# Patient Record
Sex: MALE | Race: ASIAN | URBAN - METROPOLITAN AREA
[De-identification: names, ages, dates, MRNs, and addresses within clinical notes are randomized per-mention and may not be internally consistent; named-entity substitution may affect disease eponyms.]

---

## 2023-06-23 ENCOUNTER — OFFICE VISIT (OUTPATIENT)
Dept: URBAN - METROPOLITAN AREA CLINIC 23 | Facility: CLINIC | Age: 43
End: 2023-06-23
Payer: COMMERCIAL

## 2023-06-23 ENCOUNTER — WEB ENCOUNTER (OUTPATIENT)
Dept: URBAN - METROPOLITAN AREA CLINIC 23 | Facility: CLINIC | Age: 43
End: 2023-06-23

## 2023-06-23 ENCOUNTER — TELEPHONE ENCOUNTER (OUTPATIENT)
Dept: URBAN - METROPOLITAN AREA CLINIC 92 | Facility: CLINIC | Age: 43
End: 2023-06-23

## 2023-06-23 ENCOUNTER — LAB OUTSIDE AN ENCOUNTER (OUTPATIENT)
Dept: URBAN - METROPOLITAN AREA CLINIC 23 | Facility: CLINIC | Age: 43
End: 2023-06-23

## 2023-06-23 VITALS
HEIGHT: 76 IN | BODY MASS INDEX: 28.71 KG/M2 | DIASTOLIC BLOOD PRESSURE: 85 MMHG | TEMPERATURE: 97.4 F | HEART RATE: 50 BPM | SYSTOLIC BLOOD PRESSURE: 134 MMHG | WEIGHT: 235.8 LBS

## 2023-06-23 DIAGNOSIS — K51.20 ULCERATIVE PROCTITIS WITHOUT COMPLICATION: ICD-10-CM

## 2023-06-23 DIAGNOSIS — K51.80 OTHER ULCERATIVE COLITIS: ICD-10-CM

## 2023-06-23 PROBLEM — 3951002: Status: ACTIVE | Noted: 2023-06-23

## 2023-06-23 PROCEDURE — 99204 OFFICE O/P NEW MOD 45 MIN: CPT | Performed by: INTERNAL MEDICINE

## 2023-06-23 RX ORDER — PEG-3350, SODIUM SULFATE, SODIUM CHLORIDE, POTASSIUM CHLORIDE, SODIUM ASCORBATE AND ASCORBIC ACID 7.5-2.691G
AS DIRECTED KIT ORAL AS DIRECTED
Qty: 1 KIT | Refills: 0 | OUTPATIENT
Start: 2023-06-23 | End: 2023-06-24

## 2023-06-23 NOTE — PREVIOUS WORKUP REVIEWED
. ENDOSCOPIES  LABS -Labs 6/20/2023: WBC 10, hemoglobin 12.7, platelet 283, BUN 12, creatinine 0.91, total protein 7.0, albumin 4.6, total bilirubin 0.4, alkaline phosphatase 80, AST 21, ALT 16.  TSH 2.7.  IMAGES

## 2023-06-23 NOTE — HPI-TODAY'S VISIT:
42-year-old male with a history of ulcerative colitis presents for flareup symptom.   He was first diagnosed with a UC back in 2012. Presentation was bloody stool.  To his knowledge it was contained in the rectum.  Oral mesalamine and suppository treatment.  Well-controlled  Until 2019, normal colonoscopy then and decision was made to be off the medication. He has been doing well until now, 1-2 weeks ago he started having bloody stool, on toilet paper, mucus, 3-5 bowel movements per day, postprandial.  Solid-loose consistency.

## 2023-06-26 ENCOUNTER — OFFICE VISIT (OUTPATIENT)
Dept: URBAN - METROPOLITAN AREA CLINIC 33 | Facility: CLINIC | Age: 43
End: 2023-06-26

## 2023-07-03 LAB
A/G RATIO: 1.6
ALBUMIN: 4.2
ALKALINE PHOSPHATASE: 71
ALT (SGPT): 13
AST (SGOT): 15
BILIRUBIN, TOTAL: 0.6
BUN/CREATININE RATIO: (no result)
BUN: 10
C-REACTIVE PROTEIN, QUANT: 2.5
CALCIUM: 8.8
CARBON DIOXIDE, TOTAL: 25
CHLORIDE: 107
CREATININE: 0.86
EGFR: 110
GLOBULIN, TOTAL: 2.7
GLUCOSE: 92
HEMATOCRIT: 38.5
HEMOGLOBIN: 12.8
MCH: 28.6
MCHC: 33.2
MCV: 86.1
MPV: 10.9
PLATELET COUNT: 321
POTASSIUM: 4.4
PROTEIN, TOTAL: 6.9
RDW: 13.1
RED BLOOD CELL COUNT: 4.47
SED RATE BY MODIFIED: 2
SODIUM: 140
WHITE BLOOD CELL COUNT: 6.7

## 2023-07-11 LAB
CALPROTECTIN, FECAL: 322
CAMPYLOBACTER GROUP: NOT DETECTED
CLOSTRIDIUM DIFFICILE: (no result)
NOROVIRUS GI/GII: NOT DETECTED
ROTAVIRUS A: NOT DETECTED
SALMONELLA SPECIES: NOT DETECTED
SHIGA TOXIN 1: NOT DETECTED
SHIGA TOXIN 2: NOT DETECTED
SHIGELLA SPECIES: NOT DETECTED
VIBRIO GROUP: NOT DETECTED
YERSINIA ENTEROCOLITICA: NOT DETECTED

## 2023-07-13 ENCOUNTER — CLAIMS CREATED FROM THE CLAIM WINDOW (OUTPATIENT)
Dept: URBAN - METROPOLITAN AREA CLINIC 4 | Facility: CLINIC | Age: 43
End: 2023-07-13
Payer: COMMERCIAL

## 2023-07-13 ENCOUNTER — LAB OUTSIDE AN ENCOUNTER (OUTPATIENT)
Dept: URBAN - METROPOLITAN AREA CLINIC 78 | Facility: CLINIC | Age: 43
End: 2023-07-13

## 2023-07-13 ENCOUNTER — OFFICE VISIT (OUTPATIENT)
Dept: URBAN - METROPOLITAN AREA SURGERY CENTER 15 | Facility: SURGERY CENTER | Age: 43
End: 2023-07-13
Payer: COMMERCIAL

## 2023-07-13 ENCOUNTER — TELEPHONE ENCOUNTER (OUTPATIENT)
Dept: URBAN - METROPOLITAN AREA CLINIC 78 | Facility: CLINIC | Age: 43
End: 2023-07-13

## 2023-07-13 DIAGNOSIS — K51.80 CHRONIC PANCOLONIC ULCERATIVE COLITIS: ICD-10-CM

## 2023-07-13 DIAGNOSIS — K51.919 ULCERATIVE COLITIS, UNSPECIFIED WITH UNSPECIFIED COMPLICATIONS: ICD-10-CM

## 2023-07-13 PROBLEM — 235746007: Status: ACTIVE | Noted: 2023-07-13

## 2023-07-13 PROCEDURE — G8907 PT DOC NO EVENTS ON DISCHARG: HCPCS | Performed by: INTERNAL MEDICINE

## 2023-07-13 PROCEDURE — 45380 COLONOSCOPY AND BIOPSY: CPT | Performed by: INTERNAL MEDICINE

## 2023-07-13 PROCEDURE — 88342 IMHCHEM/IMCYTCHM 1ST ANTB: CPT | Performed by: PATHOLOGY

## 2023-07-13 PROCEDURE — 88305 TISSUE EXAM BY PATHOLOGIST: CPT | Performed by: PATHOLOGY

## 2023-07-13 RX ORDER — PREDNISONE 10 MG/1
4 TABLETS TABLET ORAL ONCE A DAY
Qty: 120 TABLET | Refills: 1 | OUTPATIENT
Start: 2023-07-13 | End: 2023-09-11

## 2023-07-20 ENCOUNTER — TELEPHONE ENCOUNTER (OUTPATIENT)
Dept: URBAN - METROPOLITAN AREA CLINIC 92 | Facility: CLINIC | Age: 43
End: 2023-07-20

## 2023-07-25 ENCOUNTER — TELEPHONE ENCOUNTER (OUTPATIENT)
Dept: URBAN - METROPOLITAN AREA CLINIC 92 | Facility: CLINIC | Age: 43
End: 2023-07-25

## 2023-07-28 LAB
HBSAG SCREEN: NEGATIVE
HEP B CORE AB, TOT: POSITIVE
HEPATITIS B SURF AB QUANT: >1000
QUANTIFERON CRITERIA: (no result)
QUANTIFERON INCUBATION: (no result)
QUANTIFERON MITOGEN VALUE: >10
QUANTIFERON NIL VALUE: 0
QUANTIFERON TB1 AG VALUE: 0.25
QUANTIFERON TB2 AG VALUE: 0.37
QUANTIFERON-TB GOLD PLUS: POSITIVE

## 2023-07-29 ENCOUNTER — TELEPHONE ENCOUNTER (OUTPATIENT)
Dept: URBAN - METROPOLITAN AREA CLINIC 78 | Facility: CLINIC | Age: 43
End: 2023-07-29

## 2023-08-07 ENCOUNTER — LAB OUTSIDE AN ENCOUNTER (OUTPATIENT)
Dept: URBAN - METROPOLITAN AREA CLINIC 78 | Facility: CLINIC | Age: 43
End: 2023-08-07

## 2023-08-14 ENCOUNTER — CLAIMS CREATED FROM THE CLAIM WINDOW (OUTPATIENT)
Dept: URBAN - METROPOLITAN AREA CLINIC 23 | Facility: CLINIC | Age: 43
End: 2023-08-14

## 2023-08-14 ENCOUNTER — OFFICE VISIT (OUTPATIENT)
Dept: URBAN - METROPOLITAN AREA CLINIC 23 | Facility: CLINIC | Age: 43
End: 2023-08-14

## 2023-08-14 ENCOUNTER — TELEPHONE ENCOUNTER (OUTPATIENT)
Dept: URBAN - METROPOLITAN AREA CLINIC 92 | Facility: CLINIC | Age: 43
End: 2023-08-14

## 2023-08-14 ENCOUNTER — CLAIMS CREATED FROM THE CLAIM WINDOW (OUTPATIENT)
Dept: URBAN - METROPOLITAN AREA CLINIC 23 | Facility: CLINIC | Age: 43
End: 2023-08-14
Payer: COMMERCIAL

## 2023-08-14 VITALS
DIASTOLIC BLOOD PRESSURE: 79 MMHG | BODY MASS INDEX: 27.67 KG/M2 | SYSTOLIC BLOOD PRESSURE: 120 MMHG | TEMPERATURE: 97.2 F | WEIGHT: 227.2 LBS | HEIGHT: 76 IN | HEART RATE: 58 BPM

## 2023-08-14 DIAGNOSIS — K52.9 INFLAMMATORY BOWEL DISEASES (IBD): ICD-10-CM

## 2023-08-14 PROCEDURE — 99214 OFFICE O/P EST MOD 30 MIN: CPT | Performed by: INTERNAL MEDICINE

## 2023-08-14 RX ORDER — PREDNISONE 10 MG/1
4 TABLETS TABLET ORAL ONCE A DAY
Qty: 120 TABLET | Refills: 1 | Status: ACTIVE | COMMUNITY
Start: 2023-07-13 | End: 2023-09-11

## 2023-08-14 RX ORDER — MESALAMINE 1.2 G/1
4 TABLETS TABLET, DELAYED RELEASE ORAL ONCE A DAY
Qty: 360 TABLET | Refills: 3 | OUTPATIENT
Start: 2023-08-14 | End: 2024-08-08

## 2023-08-14 NOTE — PREVIOUS WORKUP REVIEWED
REVIEWED EXTERNAL MEDICAL RECORDENDOSCOPIES-Colonoscopy 7/13/2023:Normal TI, inflammation confluent from the rectum to the sigmoid colon. Patchy inflammation from the sigmoid-cecum. Moderate in severity.*Pathology:Ascending colon-diffuse chronic active colitis. Transverse colon-diffuse chronic active colitis. Descending colon-normal. Sigmoid colon-patchy chronic active colitis. Rectum-diffuse chronic active colitis.LABS-Labs 8/9/2023: HBV DNA not detected.-Labs 7/25/2023: Hep B cAb total positive, Hep B sAg negative. Hep B sAb positive. QuantiFERON gold positive.-Labs 7/5/2023: C. difficile negative. GI PCR negative.-Labs 6/30/2023: ESR 2, CRP 2.5, fecal calprotectin 322 H. BUN 10, creatinine 0.86, total bilirubin 0.6, alkaline phosphatase 71, AST 15, ALT 13. WBC 6.7, hemoglobin 12.8, platelet 10.9.-Labs 10/16/2023: WBC 6.7, hemoglobin 12.6, platelet 311, total bilirubin 0.4, alkaline phosphatase 81, AST 16, ALT 11.-Labs 6/20/2023: WBC 10, hemoglobin 12.7, platelet 283, BUN 12, creatinine 0.91, total protein 7.0, albumin 4.6, total bilirubin 0.4, alkaline phosphatase 80, AST 21, ALT 16. TSH 2.7.IMAGES-CT enterography 8/7/2023:Normal exam including GI tract.IBD SYNOPSISColitis, initially diagnosed as ulcerative colitis.  Colonoscopy 7/2023 suggesting possible Crohn's colitis given distribution of patchy inflammation.  Involving >30%.Good response to mesalamine.

## 2023-08-14 NOTE — HPI-TODAY'S VISIT:
43-year-old male presents for colitis.  He had colonoscopy for flareup that occurred after off the medication for long time. he has a history of ulcerative colitis, but the colonoscopy finding suggest possibility of Crohn's colitis with skip lesions. CT enterography unremarkable. He has been on prednisone 40 mg since the colonoscopy, currently his bowels back to normal.  No blood in stool.  He moves bowel once daily. QuantiFERON gold came back positive.  And hep B serology positive suggesting past infection.

## 2023-08-16 LAB
HBV LOG10: (no result)
HEPATITIS B QUANTITATION: (no result)
TEST INFORMATION:: (no result)

## 2023-10-05 ENCOUNTER — TELEPHONE ENCOUNTER (OUTPATIENT)
Dept: URBAN - METROPOLITAN AREA CLINIC 23 | Facility: CLINIC | Age: 43
End: 2023-10-05

## 2023-10-06 ENCOUNTER — TELEPHONE ENCOUNTER (OUTPATIENT)
Dept: URBAN - METROPOLITAN AREA CLINIC 92 | Facility: CLINIC | Age: 43
End: 2023-10-06

## 2023-10-27 ENCOUNTER — OFFICE VISIT (OUTPATIENT)
Dept: URBAN - METROPOLITAN AREA CLINIC 23 | Facility: CLINIC | Age: 43
End: 2023-10-27
Payer: COMMERCIAL

## 2023-10-27 VITALS
TEMPERATURE: 97.6 F | HEIGHT: 76 IN | HEART RATE: 80 BPM | SYSTOLIC BLOOD PRESSURE: 133 MMHG | WEIGHT: 232.6 LBS | BODY MASS INDEX: 28.33 KG/M2 | DIASTOLIC BLOOD PRESSURE: 86 MMHG

## 2023-10-27 DIAGNOSIS — A15.9 TUBERCULOSIS: ICD-10-CM

## 2023-10-27 DIAGNOSIS — K50.10 CROHN'S DISEASE OF COLON WITHOUT COMPLICATION: ICD-10-CM

## 2023-10-27 PROCEDURE — 99213 OFFICE O/P EST LOW 20 MIN: CPT | Performed by: INTERNAL MEDICINE

## 2023-10-27 RX ORDER — MESALAMINE 1.2 G/1
4 TABLETS TABLET, DELAYED RELEASE ORAL ONCE A DAY
Qty: 360 TABLET | Refills: 3 | Status: ACTIVE | COMMUNITY
Start: 2023-08-14 | End: 2024-08-08

## 2023-10-27 NOTE — PREVIOUS WORKUP REVIEWED
REVIEWED EXTERNAL MEDICAL RECORD ENDOSCOPIES -Colonoscopy 7/13/2023:Normal TI, inflammation confluent from the rectum to the sigmoid colon. Patchy inflammation from the sigmoid-cecum. Moderate in severity. *Pathology:Ascending colon-diffuse chronic active colitis. Transverse colon-diffuse chronic active colitis. Descending colon-normal. Sigmoid colon-patchy chronic active colitis. Rectum-diffuse chronic active colitis.  LABS -Labs 10/16/2023: WBC 6.7, hemoglobin 12.6, platelet 311, BUN 11, creatinine 0.87, total protein 6.7, albumin 4.4, total bilirubin 0.4, alkaline phosphatase 81, AST 16, ALT 11. -Labs 8/9/2023: HBV DNA not detected.-Labs 7/25/2023: Hep B cAb total positive, Hep B sAg negative. Hep B sAb positive. QuantiFERON gold positive.-Labs 7/5/2023: C. difficile negative. GI PCR negative.-Labs 6/30/2023: ESR 2, CRP 2.5, fecal calprotectin 322 H. BUN 10, creatinine 0.86, total bilirubin 0.6, alkaline phosphatase 71, AST 15, ALT 13. WBC 6.7, hemoglobin 12.8, platelet 10.9.-Labs 10/16/2023: WBC 6.7, hemoglobin 12.6, platelet 311, total bilirubin 0.4, alkaline phosphatase 81, AST 16, ALT 11. -Labs 6/20/2023: WBC 10, hemoglobin 12.7, platelet 283, BUN 12, creatinine 0.91, total protein 7.0, albumin 4.6, total bilirubin 0.4, alkaline phosphatase 80, AST 21, ALT 16. TSH 2.7.  IMAGES -CT enterography 8/7/2023:Normal exam including GI tract.  IBD SYNOPSIS Colitis, initially diagnosed as ulcerative colitis.  Colonoscopy 7/2023 suggesting possible Crohn's colitis given distribution of patchy inflammation.  Involving >30%. Good response to mesalamine.

## 2023-10-27 NOTE — HPI-TODAY'S VISIT:
43-year-old male presents for follow-up of Crohn's colitis.  He is doing well.  He moves bowel once daily.  Completely off prednisone. He is on metformin 4.8 g daily.   He is seeing ID for tuberculosis.  He is taking rifampin, with plan of 4 months treatment.  Dr. Solis.

## 2023-11-11 PROBLEM — 56717001: Status: ACTIVE | Noted: 2023-11-11

## 2024-04-26 ENCOUNTER — OV EP (OUTPATIENT)
Dept: URBAN - METROPOLITAN AREA CLINIC 23 | Facility: CLINIC | Age: 44
End: 2024-04-26
Payer: COMMERCIAL

## 2024-04-26 ENCOUNTER — LAB (OUTPATIENT)
Dept: URBAN - METROPOLITAN AREA CLINIC 23 | Facility: CLINIC | Age: 44
End: 2024-04-26

## 2024-04-26 VITALS
WEIGHT: 243 LBS | HEIGHT: 76 IN | DIASTOLIC BLOOD PRESSURE: 81 MMHG | SYSTOLIC BLOOD PRESSURE: 127 MMHG | TEMPERATURE: 97.7 F | HEART RATE: 50 BPM | BODY MASS INDEX: 29.59 KG/M2

## 2024-04-26 DIAGNOSIS — Z20.5 EXPOSURE TO HEPATITIS B: ICD-10-CM

## 2024-04-26 DIAGNOSIS — K50.10 CROHN'S DISEASE OF COLON WITHOUT COMPLICATION: ICD-10-CM

## 2024-04-26 DIAGNOSIS — Z22.7 LATENT TUBERCULOSIS: ICD-10-CM

## 2024-04-26 PROBLEM — 50440006: Status: ACTIVE | Noted: 2024-04-26

## 2024-04-26 PROCEDURE — 99214 OFFICE O/P EST MOD 30 MIN: CPT | Performed by: INTERNAL MEDICINE

## 2024-04-26 RX ORDER — MESALAMINE 1.2 G/1
4 TABLETS TABLET, DELAYED RELEASE ORAL ONCE A DAY
Qty: 360 TABLET | Refills: 3 | Status: ACTIVE | COMMUNITY
Start: 2023-08-14 | End: 2024-08-08

## 2024-04-26 RX ORDER — SODIUM, POTASSIUM,MAG SULFATES 17.5-3.13G
177 ML THE FIRST DOSE THE EVENING BEFORE AND SECOND DOSE 5-8 HRS BEFORE COLONOSCOPY. THIS IS A GENERIC FOR SUPREP SOLUTION, RECONSTITUTED, ORAL ORAL AS DIRECTED
Qty: 354 MILLILITER | Refills: 0 | OUTPATIENT
Start: 2024-04-26 | End: 2024-04-27

## 2024-04-26 RX ORDER — MESALAMINE 1.2 G/1
4 TABLETS TABLET, DELAYED RELEASE ORAL ONCE A DAY
Qty: 360 TABLET | Refills: 3
Start: 2023-08-14 | End: 2025-04-21

## 2024-04-26 NOTE — HPI-TODAY'S VISIT:
43-year-old male with Crohn's colitis presents for follow-up.  He takes mesalamine 4.8 g daily.  Is doing well, moves bowel couple times a day solid.  He had a 1 episode of minor flareup in the interim.  Currently back to normal baseline.  He finished tuberculosis treatment.

## 2024-04-26 NOTE — PREVIOUS WORKUP REVIEWED EXTERNAL MEDICAL RECORD
REVIEWED EXTERNAL MEDICAL RECORD ENDOSCOPIES -Colonoscopy 7/13/2023:Normal TI, inflammation confluent from the rectum to the sigmoid colon. Patchy inflammation from the sigmoid-cecum. Moderate in severity. *Pathology:Ascending colon-diffuse chronic active colitis. Transverse colon-diffuse chronic active colitis. Descending colon-normal. Sigmoid colon-patchy chronic active colitis. Rectum-diffuse chronic active colitis.  LABS -Labs 10/16/2023: WBC 6.7, hemoglobin 12.6, platelet 311, BUN 11, creatinine 0.87, total protein 6.7, albumin 4.4, total bilirubin 0.4, alkaline phosphatase 81, AST 16, ALT 11. -Labs 8/9/2023: HBV DNA not detected.-Labs 7/25/2023: Hep B cAb total positive, Hep B sAg negative. Hep B sAb positive. QuantiFERON gold positive.-Labs 7/5/2023: C. difficile negative. GI PCR negative.-Labs 6/30/2023: ESR 2, CRP 2.5, fecal calprotectin 322 H. BUN 10, creatinine 0.86, total bilirubin 0.6, alkaline phosphatase 71, AST 15, ALT 13. WBC 6.7, hemoglobin 12.8, platelet 10.9.-Labs 10/16/2023: WBC 6.7, hemoglobin 12.6, platelet 311, total bilirubin 0.4, alkaline phosphatase 81, AST 16, ALT 11. -Labs 6/20/2023: WBC 10, hemoglobin 12.7, platelet 283, BUN 12, creatinine 0.91, total protein 7.0, albumin 4.6, total bilirubin 0.4, alkaline phosphatase 80, AST 21, ALT 16. TSH 2.7.  IMAGES -CT enterography 8/7/2023:Normal exam including GI tract.  IBD SYNOPSIS Colitis, initially diagnosed as ulcerative colitis.  Colonoscopy 7/2023 suggesting possible Crohn's colitis given distribution of patchy inflammation.  Involving >30%. Good response to mesalamine. , -

## 2024-05-16 ENCOUNTER — OFFICE VISIT (OUTPATIENT)
Dept: URBAN - METROPOLITAN AREA SURGERY CENTER 15 | Facility: SURGERY CENTER | Age: 44
End: 2024-05-16

## 2024-05-30 ENCOUNTER — OFFICE VISIT (OUTPATIENT)
Dept: URBAN - METROPOLITAN AREA SURGERY CENTER 15 | Facility: SURGERY CENTER | Age: 44
End: 2024-05-30

## 2024-05-30 RX ORDER — MESALAMINE 1.2 G/1
4 TABLETS TABLET, DELAYED RELEASE ORAL ONCE A DAY
Qty: 360 TABLET | Refills: 3 | Status: ACTIVE | COMMUNITY
Start: 2023-08-14 | End: 2025-04-21

## 2024-05-30 RX ORDER — MESALAMINE 1000 MG/1
1 SUPPOSITORY AT BEDTIME SUPPOSITORY RECTAL ONCE A DAY
Qty: 30 | Refills: 1 | OUTPATIENT
Start: 2024-05-30 | End: 2024-07-29

## 2024-06-03 ENCOUNTER — TELEPHONE ENCOUNTER (OUTPATIENT)
Dept: URBAN - METROPOLITAN AREA CLINIC 78 | Facility: CLINIC | Age: 44
End: 2024-06-03

## 2024-06-03 RX ORDER — VEDOLIZUMAB 300 MG/5ML
AS DIRECTED INJECTION, POWDER, LYOPHILIZED, FOR SOLUTION INTRAVENOUS
OUTPATIENT
Start: 2024-06-03

## 2024-06-12 ENCOUNTER — TELEPHONE ENCOUNTER (OUTPATIENT)
Dept: URBAN - METROPOLITAN AREA CLINIC 23 | Facility: CLINIC | Age: 44
End: 2024-06-12

## 2024-07-22 ENCOUNTER — TELEPHONE ENCOUNTER (OUTPATIENT)
Dept: URBAN - METROPOLITAN AREA CLINIC 23 | Facility: CLINIC | Age: 44
End: 2024-07-22

## 2024-07-22 ENCOUNTER — OFFICE VISIT (OUTPATIENT)
Dept: URBAN - METROPOLITAN AREA CLINIC 77 | Facility: CLINIC | Age: 44
End: 2024-07-22
Payer: COMMERCIAL

## 2024-07-22 VITALS
DIASTOLIC BLOOD PRESSURE: 71 MMHG | TEMPERATURE: 98.5 F | SYSTOLIC BLOOD PRESSURE: 128 MMHG | BODY MASS INDEX: 28.49 KG/M2 | WEIGHT: 234 LBS | HEIGHT: 76 IN

## 2024-07-22 DIAGNOSIS — K50.80 CROHN'S COLITIS: ICD-10-CM

## 2024-07-22 PROCEDURE — 96413 CHEMO IV INFUSION 1 HR: CPT | Performed by: INTERNAL MEDICINE

## 2024-07-22 RX ORDER — VEDOLIZUMAB 300 MG/5ML
AS DIRECTED INJECTION, POWDER, LYOPHILIZED, FOR SOLUTION INTRAVENOUS
Status: ACTIVE | COMMUNITY
Start: 2024-06-03

## 2024-07-22 RX ORDER — MESALAMINE 1.2 G/1
4 TABLETS TABLET, DELAYED RELEASE ORAL ONCE A DAY
Qty: 360 TABLET | Refills: 3 | Status: ACTIVE | COMMUNITY
Start: 2023-08-14 | End: 2025-04-21

## 2024-07-22 RX ORDER — MESALAMINE 1000 MG/1
1 SUPPOSITORY AT BEDTIME SUPPOSITORY RECTAL ONCE A DAY
Qty: 30 | Refills: 1 | Status: ACTIVE | COMMUNITY
Start: 2024-05-30 | End: 2024-07-29

## 2024-07-30 ENCOUNTER — TELEPHONE ENCOUNTER (OUTPATIENT)
Dept: URBAN - METROPOLITAN AREA CLINIC 23 | Facility: CLINIC | Age: 44
End: 2024-07-30

## 2024-07-30 RX ORDER — MESALAMINE 1000 MG/1
1 SUPPOSITORY AT BEDTIME SUPPOSITORY RECTAL ONCE A DAY
Qty: 30 | Refills: 1
Start: 2024-05-30 | End: 2024-09-28

## 2024-08-05 ENCOUNTER — OFFICE VISIT (OUTPATIENT)
Dept: URBAN - METROPOLITAN AREA CLINIC 77 | Facility: CLINIC | Age: 44
End: 2024-08-05
Payer: COMMERCIAL

## 2024-08-05 VITALS
WEIGHT: 230 LBS | DIASTOLIC BLOOD PRESSURE: 79 MMHG | BODY MASS INDEX: 28.01 KG/M2 | HEIGHT: 76 IN | SYSTOLIC BLOOD PRESSURE: 121 MMHG | TEMPERATURE: 98.7 F

## 2024-08-05 DIAGNOSIS — K50.80 CROHN'S COLITIS: ICD-10-CM

## 2024-08-05 PROCEDURE — 96413 CHEMO IV INFUSION 1 HR: CPT | Performed by: INTERNAL MEDICINE

## 2024-08-05 RX ORDER — MESALAMINE 1000 MG/1
1 SUPPOSITORY AT BEDTIME SUPPOSITORY RECTAL ONCE A DAY
Qty: 30 | Refills: 1 | Status: ACTIVE | COMMUNITY
Start: 2024-05-30 | End: 2024-09-28

## 2024-08-05 RX ORDER — MESALAMINE 1.2 G/1
4 TABLETS TABLET, DELAYED RELEASE ORAL ONCE A DAY
Qty: 360 TABLET | Refills: 3 | Status: ACTIVE | COMMUNITY
Start: 2023-08-14 | End: 2025-04-21

## 2024-08-05 RX ORDER — VEDOLIZUMAB 300 MG/5ML
AS DIRECTED INJECTION, POWDER, LYOPHILIZED, FOR SOLUTION INTRAVENOUS
Status: ACTIVE | COMMUNITY
Start: 2024-06-03

## 2024-09-06 ENCOUNTER — OFFICE VISIT (OUTPATIENT)
Dept: URBAN - METROPOLITAN AREA CLINIC 97 | Facility: CLINIC | Age: 44
End: 2024-09-06
Payer: COMMERCIAL

## 2024-09-06 VITALS
HEIGHT: 76 IN | RESPIRATION RATE: 18 BRPM | SYSTOLIC BLOOD PRESSURE: 135 MMHG | TEMPERATURE: 98.4 F | HEART RATE: 84 BPM | WEIGHT: 228 LBS | DIASTOLIC BLOOD PRESSURE: 76 MMHG | BODY MASS INDEX: 27.76 KG/M2

## 2024-09-06 DIAGNOSIS — K50.80 CROHN'S COLITIS: ICD-10-CM

## 2024-09-06 PROCEDURE — 96413 CHEMO IV INFUSION 1 HR: CPT | Performed by: INTERNAL MEDICINE

## 2024-09-06 RX ORDER — VEDOLIZUMAB 300 MG/5ML
AS DIRECTED INJECTION, POWDER, LYOPHILIZED, FOR SOLUTION INTRAVENOUS
Status: ACTIVE | COMMUNITY
Start: 2024-06-03

## 2024-09-06 RX ORDER — MESALAMINE 1.2 G/1
4 TABLETS TABLET, DELAYED RELEASE ORAL ONCE A DAY
Qty: 360 TABLET | Refills: 3 | Status: ACTIVE | COMMUNITY
Start: 2023-08-14 | End: 2025-04-21

## 2024-09-06 RX ORDER — MESALAMINE 1000 MG/1
1 SUPPOSITORY AT BEDTIME SUPPOSITORY RECTAL ONCE A DAY
Qty: 30 | Refills: 1 | Status: ACTIVE | COMMUNITY
Start: 2024-05-30 | End: 2024-09-28

## 2024-10-28 ENCOUNTER — OFFICE VISIT (OUTPATIENT)
Dept: URBAN - METROPOLITAN AREA CLINIC 77 | Facility: CLINIC | Age: 44
End: 2024-10-28
Payer: COMMERCIAL

## 2024-10-28 VITALS
BODY MASS INDEX: 27.4 KG/M2 | DIASTOLIC BLOOD PRESSURE: 67 MMHG | WEIGHT: 225 LBS | SYSTOLIC BLOOD PRESSURE: 119 MMHG | TEMPERATURE: 98.4 F | HEIGHT: 76 IN

## 2024-10-28 DIAGNOSIS — K50.80 CROHN'S COLITIS: ICD-10-CM

## 2024-10-28 PROCEDURE — 96413 CHEMO IV INFUSION 1 HR: CPT | Performed by: INTERNAL MEDICINE

## 2024-10-28 RX ORDER — VEDOLIZUMAB 300 MG/5ML
AS DIRECTED INJECTION, POWDER, LYOPHILIZED, FOR SOLUTION INTRAVENOUS
Status: ACTIVE | COMMUNITY
Start: 2024-06-03

## 2024-10-28 RX ORDER — MESALAMINE 1.2 G/1
4 TABLETS TABLET, DELAYED RELEASE ORAL ONCE A DAY
Qty: 360 TABLET | Refills: 3 | Status: ACTIVE | COMMUNITY
Start: 2023-08-14 | End: 2025-04-21

## 2024-12-30 ENCOUNTER — OFFICE VISIT (OUTPATIENT)
Dept: URBAN - METROPOLITAN AREA CLINIC 77 | Facility: CLINIC | Age: 44
End: 2024-12-30
Payer: COMMERCIAL

## 2024-12-30 VITALS
BODY MASS INDEX: 27.4 KG/M2 | HEIGHT: 76 IN | DIASTOLIC BLOOD PRESSURE: 87 MMHG | SYSTOLIC BLOOD PRESSURE: 148 MMHG | WEIGHT: 225 LBS | TEMPERATURE: 98.3 F

## 2024-12-30 DIAGNOSIS — K50.10 CROHN'S DISEASE OF COLON WITHOUT COMPLICATION: ICD-10-CM

## 2024-12-30 PROCEDURE — 96413 CHEMO IV INFUSION 1 HR: CPT | Performed by: INTERNAL MEDICINE

## 2024-12-30 RX ORDER — MESALAMINE 1.2 G/1
4 TABLETS TABLET, DELAYED RELEASE ORAL ONCE A DAY
Qty: 360 TABLET | Refills: 3 | Status: ACTIVE | COMMUNITY
Start: 2023-08-14 | End: 2025-04-21

## 2024-12-30 RX ORDER — VEDOLIZUMAB 300 MG/5ML
AS DIRECTED INJECTION, POWDER, LYOPHILIZED, FOR SOLUTION INTRAVENOUS
Status: ACTIVE | COMMUNITY
Start: 2024-06-03

## 2025-02-24 ENCOUNTER — OFFICE VISIT (OUTPATIENT)
Dept: URBAN - METROPOLITAN AREA CLINIC 77 | Facility: CLINIC | Age: 45
End: 2025-02-24
Payer: COMMERCIAL

## 2025-02-24 VITALS
BODY MASS INDEX: 28.18 KG/M2 | DIASTOLIC BLOOD PRESSURE: 77 MMHG | SYSTOLIC BLOOD PRESSURE: 126 MMHG | RESPIRATION RATE: 18 BRPM | WEIGHT: 231.4 LBS | TEMPERATURE: 98.4 F | HEIGHT: 76 IN

## 2025-02-24 DIAGNOSIS — K50.10 CROHN'S DISEASE OF COLON WITHOUT COMPLICATION: ICD-10-CM

## 2025-02-24 PROCEDURE — 96413 CHEMO IV INFUSION 1 HR: CPT | Performed by: INTERNAL MEDICINE

## 2025-02-24 RX ORDER — VEDOLIZUMAB 300 MG/5ML
AS DIRECTED INJECTION, POWDER, LYOPHILIZED, FOR SOLUTION INTRAVENOUS
Status: ACTIVE | COMMUNITY
Start: 2024-06-03

## 2025-02-24 RX ORDER — MESALAMINE 1.2 G/1
4 TABLETS TABLET, DELAYED RELEASE ORAL ONCE A DAY
Qty: 360 TABLET | Refills: 3 | Status: ACTIVE | COMMUNITY
Start: 2023-08-14 | End: 2025-04-21

## 2025-04-21 ENCOUNTER — OFFICE VISIT (OUTPATIENT)
Dept: URBAN - METROPOLITAN AREA CLINIC 77 | Facility: CLINIC | Age: 45
End: 2025-04-21
Payer: COMMERCIAL

## 2025-04-21 DIAGNOSIS — K50.10 CC (CROHN'S COLITIS): ICD-10-CM

## 2025-04-21 PROCEDURE — 96413 CHEMO IV INFUSION 1 HR: CPT | Performed by: INTERNAL MEDICINE

## 2025-04-21 RX ORDER — VEDOLIZUMAB 300 MG/5ML
AS DIRECTED INJECTION, POWDER, LYOPHILIZED, FOR SOLUTION INTRAVENOUS
Status: ACTIVE | COMMUNITY
Start: 2024-06-03

## 2025-04-21 RX ORDER — MESALAMINE 1.2 G/1
4 TABLETS TABLET, DELAYED RELEASE ORAL ONCE A DAY
Qty: 360 TABLET | Refills: 3 | Status: ACTIVE | COMMUNITY
Start: 2023-08-14 | End: 2025-04-21

## 2025-04-24 ENCOUNTER — TELEPHONE ENCOUNTER (OUTPATIENT)
Dept: URBAN - METROPOLITAN AREA CLINIC 6 | Facility: CLINIC | Age: 45
End: 2025-04-24

## 2025-05-28 ENCOUNTER — TELEPHONE ENCOUNTER (OUTPATIENT)
Dept: URBAN - METROPOLITAN AREA CLINIC 6 | Facility: CLINIC | Age: 45
End: 2025-05-28

## 2025-05-28 RX ORDER — VEDOLIZUMAB 300 MG/5ML
AS DIRECTED INJECTION, POWDER, LYOPHILIZED, FOR SOLUTION INTRAVENOUS
Start: 2024-06-03 | End: 2026-05-29

## 2025-06-03 ENCOUNTER — DASHBOARD ENCOUNTERS (OUTPATIENT)
Age: 45
End: 2025-06-03

## 2025-06-03 ENCOUNTER — OFFICE VISIT (OUTPATIENT)
Dept: URBAN - METROPOLITAN AREA CLINIC 23 | Facility: CLINIC | Age: 45
End: 2025-06-03
Payer: COMMERCIAL

## 2025-06-03 ENCOUNTER — LAB OUTSIDE AN ENCOUNTER (OUTPATIENT)
Dept: URBAN - METROPOLITAN AREA CLINIC 23 | Facility: CLINIC | Age: 45
End: 2025-06-03

## 2025-06-03 DIAGNOSIS — K50.10 CROHN'S DISEASE OF COLON WITHOUT COMPLICATION: ICD-10-CM

## 2025-06-03 PROCEDURE — 99214 OFFICE O/P EST MOD 30 MIN: CPT

## 2025-06-03 RX ORDER — VEDOLIZUMAB 300 MG/5ML
AS DIRECTED INJECTION, POWDER, LYOPHILIZED, FOR SOLUTION INTRAVENOUS
Status: ACTIVE | COMMUNITY
Start: 2024-06-03 | End: 2026-05-29

## 2025-06-03 NOTE — HPI-TODAY'S VISIT:
44-year-old female here for annual follow-up.  He was last seen in clinic 2024 by Dr. Walsh for Crohn's colitis.  He is on mesalamine 4.8 g daily.  1 episode of minor flare up in interim.  Colonoscopy May 2024 showed 2 hyperplastic polyps in transverse colon, 3 hyperplastic polyps in sigmoid colon, hyperplastic polyp in rectum, and mild active inflammation in rectum. He stopped mesalamine and entyvio   Today, he states he is doing well. No issues with entyvio.  1 BM daily. No abd pain, bleeding, change in bowel habits, wt loss. No GI concerns at this time.

## 2025-06-06 ENCOUNTER — LAB OUTSIDE AN ENCOUNTER (OUTPATIENT)
Dept: URBAN - METROPOLITAN AREA CLINIC 23 | Facility: CLINIC | Age: 45
End: 2025-06-06

## 2025-06-09 ENCOUNTER — OFFICE VISIT (OUTPATIENT)
Dept: URBAN - METROPOLITAN AREA CLINIC 77 | Facility: CLINIC | Age: 45
End: 2025-06-09

## 2025-06-10 LAB
A/G RATIO: 1.9
ABSOLUTE BASOPHILS: 58
ABSOLUTE EOSINOPHILS: 138
ABSOLUTE LYMPHOCYTES: 2003
ABSOLUTE MONOCYTES: 398
ABSOLUTE NEUTROPHILS: 2703
ALBUMIN: 4.5
ALKALINE PHOSPHATASE: 81
ALT (SGPT): 13
AST (SGOT): 17
BASOPHILS: 1.1
BILIRUBIN, TOTAL: 0.4
BUN/CREATININE RATIO: (no result)
BUN: 13
C-REACTIVE PROTEIN, QUANT: <3
CALCIUM: 9.2
CALPROTECTIN, STOOL - QDX: (no result)
CARBON DIOXIDE, TOTAL: 24
CHLORIDE: 108
CREATININE: 0.99
EGFR: 96
EOSINOPHILS: 2.6
GLOBULIN, TOTAL: 2.4
GLUCOSE: 91
HEMATOCRIT: 38.1
HEMOGLOBIN: 12.4
HEPATITIS B SURFACE ANTIGEN: (no result)
LYMPHOCYTES: 37.8
MCH: 28.1
MCHC: 32.5
MCV: 86.4
MITOGEN-NIL: 7.94
MONOCYTES: 7.5
MPV: 11
NEUTROPHILS: 51
PLATELET COUNT: 290
POTASSIUM: 4.5
PROTEIN, TOTAL: 6.9
QUANTIFERON NIL VALUE: 0.02
QUANTIFERON TB1 AG VALUE: 0.08
QUANTIFERON TB2 AG VALUE: 0.07
QUANTIFERON-TB GOLD PLUS: NEGATIVE
RDW: 13.2
RED BLOOD CELL COUNT: 4.41
SODIUM: 141
WHITE BLOOD CELL COUNT: 5.3

## 2025-06-13 ENCOUNTER — OFFICE VISIT (OUTPATIENT)
Dept: URBAN - METROPOLITAN AREA CLINIC 77 | Facility: CLINIC | Age: 45
End: 2025-06-13

## 2025-06-20 ENCOUNTER — OFFICE VISIT (OUTPATIENT)
Dept: URBAN - METROPOLITAN AREA CLINIC 77 | Facility: CLINIC | Age: 45
End: 2025-06-20
Payer: COMMERCIAL

## 2025-06-20 DIAGNOSIS — K51.80 CHRONIC PANCOLONIC ULCERATIVE COLITIS: ICD-10-CM

## 2025-06-20 PROCEDURE — 96413 CHEMO IV INFUSION 1 HR: CPT | Performed by: INTERNAL MEDICINE

## 2025-06-20 RX ORDER — VEDOLIZUMAB 300 MG/5ML
AS DIRECTED INJECTION, POWDER, LYOPHILIZED, FOR SOLUTION INTRAVENOUS
Status: ACTIVE | COMMUNITY
Start: 2024-06-03 | End: 2026-05-29

## 2025-08-15 ENCOUNTER — OFFICE VISIT (OUTPATIENT)
Dept: URBAN - METROPOLITAN AREA CLINIC 77 | Facility: CLINIC | Age: 45
End: 2025-08-15

## 2025-08-22 ENCOUNTER — OFFICE VISIT (OUTPATIENT)
Dept: URBAN - METROPOLITAN AREA CLINIC 77 | Facility: CLINIC | Age: 45
End: 2025-08-22
Payer: COMMERCIAL

## 2025-08-22 DIAGNOSIS — K51.80 CHRONIC PANCOLONIC ULCERATIVE COLITIS: ICD-10-CM

## 2025-08-22 PROCEDURE — 96413 CHEMO IV INFUSION 1 HR: CPT | Performed by: INTERNAL MEDICINE

## 2025-08-22 RX ORDER — VEDOLIZUMAB 300 MG/5ML
AS DIRECTED INJECTION, POWDER, LYOPHILIZED, FOR SOLUTION INTRAVENOUS
Status: ACTIVE | COMMUNITY
Start: 2024-06-03 | End: 2026-05-29